# Patient Record
Sex: FEMALE | Race: WHITE | NOT HISPANIC OR LATINO | Employment: UNEMPLOYED | URBAN - METROPOLITAN AREA
[De-identification: names, ages, dates, MRNs, and addresses within clinical notes are randomized per-mention and may not be internally consistent; named-entity substitution may affect disease eponyms.]

---

## 2017-01-18 RX ORDER — LISINOPRIL AND HYDROCHLOROTHIAZIDE 12.5; 1 MG/1; MG/1
TABLET ORAL
Qty: 90 TAB | Refills: 1 | Status: SHIPPED | OUTPATIENT
Start: 2017-01-18 | End: 2017-05-02

## 2017-01-18 NOTE — TELEPHONE ENCOUNTER
Refill X 6 months, sent to pharmacy.Pt. Seen in the last 6 months per protocol.   Lab Results   Component Value Date/Time    SODIUM 135 11/15/2016 08:21 AM    POTASSIUM 3.6 11/15/2016 08:21 AM    CHLORIDE 100 11/15/2016 08:21 AM    CO2 27 11/15/2016 08:21 AM    GLUCOSE 86 11/15/2016 08:21 AM    BUN 18 11/15/2016 08:21 AM    CREATININE 0.67 11/15/2016 08:21 AM

## 2017-01-18 NOTE — TELEPHONE ENCOUNTER
Was the patient seen in the last year in this department? Yes     Does patient have an active prescription for medications requested? No     Received Request Via: Pharmacy      Pt met protocol?: Yes, LABS & OV 11/16 /70

## 2017-04-11 RX ORDER — LEVOTHYROXINE SODIUM 0.03 MG/1
TABLET ORAL
Qty: 45 TAB | Refills: 0 | Status: SHIPPED | OUTPATIENT
Start: 2017-04-11 | End: 2017-05-02 | Stop reason: SDUPTHER

## 2017-05-01 ENCOUNTER — TELEPHONE (OUTPATIENT)
Dept: MEDICAL GROUP | Facility: PHYSICIAN GROUP | Age: 52
End: 2017-05-01

## 2017-05-01 NOTE — TELEPHONE ENCOUNTER
ESTABLISHED PATIENT PRE-VISIT PLANNING     Note: Patient will not be contacted if there is no indication to call.     1.  Reviewed note from last office visit with PCP and/or other med group provider: Yes    2.  If any orders were placed at last visit, do we have Results/Consult Notes?        •  Labs - Labs ordered, completed and results are in chart.       •  Imaging - Imaging was not ordered at last office visit.       •  Referrals - No referrals were ordered at last office visit.    3.  Immunizations were updated in [facility] using WebIZ?: Yes       •  Web Iz Recommendations: TDAP    4.  Patient is due for the following Health Maintenance Topics:   Health Maintenance Due   Topic Date Due   • IMM DTaP/Tdap/Td Vaccine (1 - Tdap) 10/07/1986   • MAMMOGRAM  08/07/2016           5.  Patient was not informed to arrive 15 min prior to their scheduled appointment and bring in their medication bottles.

## 2017-05-02 ENCOUNTER — OFFICE VISIT (OUTPATIENT)
Dept: MEDICAL GROUP | Facility: PHYSICIAN GROUP | Age: 52
End: 2017-05-02
Payer: COMMERCIAL

## 2017-05-02 VITALS
SYSTOLIC BLOOD PRESSURE: 104 MMHG | OXYGEN SATURATION: 96 % | WEIGHT: 135 LBS | TEMPERATURE: 98.2 F | RESPIRATION RATE: 14 BRPM | HEIGHT: 64 IN | HEART RATE: 68 BPM | DIASTOLIC BLOOD PRESSURE: 64 MMHG | BODY MASS INDEX: 23.05 KG/M2

## 2017-05-02 DIAGNOSIS — E55.9 VITAMIN D INSUFFICIENCY: ICD-10-CM

## 2017-05-02 DIAGNOSIS — I10 ESSENTIAL HYPERTENSION: ICD-10-CM

## 2017-05-02 DIAGNOSIS — E03.1 CONGENITAL HYPOTHYROIDISM WITHOUT GOITER: ICD-10-CM

## 2017-05-02 DIAGNOSIS — Z00.00 PREVENTATIVE HEALTH CARE: ICD-10-CM

## 2017-05-02 PROCEDURE — 99214 OFFICE O/P EST MOD 30 MIN: CPT | Performed by: NURSE PRACTITIONER

## 2017-05-02 RX ORDER — LEVOTHYROXINE SODIUM 0.03 MG/1
TABLET ORAL
Qty: 45 TAB | Refills: 0 | Status: SHIPPED | OUTPATIENT
Start: 2017-05-02 | End: 2017-07-14 | Stop reason: SDUPTHER

## 2017-05-02 RX ORDER — HYDROCHLOROTHIAZIDE 12.5 MG/1
12.5 TABLET ORAL DAILY
Qty: 30 TAB | Refills: 2 | Status: SHIPPED | OUTPATIENT
Start: 2017-05-02 | End: 2017-10-05

## 2017-05-02 NOTE — MR AVS SNAPSHOT
"Laishana Pedro Pablo   2017 10:15 AM   Office Visit   MRN: [id number]    Department:  [facility]   Dept Phone:  [phone]    Description:  Female : 1965   Provider:  DEVI Rivero           Reason for Visit     Hypertension follow up       Allergies as of 2017     No Known Allergies      You were diagnosed with     Congenital hypothyroidism without goiter   [846866]       Essential hypertension   [4732370]       Preventative health care   [967632]       Vitamin D insufficiency   [778980]         Vital Signs     Blood Pressure Pulse Temperature Respirations Height Weight    104/64 mmHg 68 36.8 °C (98.2 °F) 14 1.613 m (5' 3.5\") 61.236 kg (135 lb)    Body Mass Index Oxygen Saturation Smoking Status             23.54 kg/m2 96% Former Smoker         Basic Information     Date Of Birth Sex Race Ethnicity Preferred Language    1965 Female White Non- English      Your appointments     Jul 10, 2017 10:15 AM   Established Patient with DEVI Rivero   [facility])    [address] [place] [address], [address]  [facility] [address] [phone]           You will be receiving a confirmation call a few days before your appointment from our automated call confirmation system.              Problem List              ICD-10-CM Priority Class Noted - Resolved    Essential hypertension I10   3/7/2016 - Present    Congenital hypothyroidism without goiter E03.1   3/7/2016 - Present    Vitamin D insufficiency E55.9   2016 - Present      Health Maintenance        Date Due Completion Dates    IMM DTaP/Tdap/Td Vaccine (1 - Tdap) 10/7/1986 ---    MAMMOGRAM 2016    PAP SMEAR 3/13/2019 3/13/2016            Current Immunizations     FLUMIST QUAD 10/7/2015    Influenza Vaccine Quad Inj (Preserved) 10/22/2016      Below and/or attached are the medications your provider expects you to take. Review all of your home medications and newly " ordered medications with your provider and/or pharmacist. Follow medication instructions as directed by your provider and/or pharmacist. Please keep your medication list with you and share with your provider. Update the information when medications are discontinued, doses are changed, or new medications (including over-the-counter products) are added; and carry medication information at all times in the event of emergency situations     Allergies:  No Known Allergies          Medications  Valid as of: May 02, 2017 - 10:52 AM    Generic Name Brand Name Tablet Size Instructions for use    HydroCHLOROthiazide (Tab) HYDRODIURIL 12.5 MG Take 1 Tab by mouth every day.        Levothyroxine Sodium (Tab) SYNTHROID 25 MCG TAKE 1/2 TABLET BY MOUTH ONCE DAILY IN THE MORNING ON AN EMPTY STOMACH FOR UNDERACTIVE THYROID.        .                 Medicines prescribed today were sent to:     [facility] [address] [place] [address] [address] [facility]    [address] [place] [address]    Phone: [phone] Fax: [phone]    Open 24 Hours?: No      Medication refill instructions:       If your prescription bottle indicates you have medication refills left, it is not necessary to call your provider’s office. Please contact your pharmacy and they will refill your medication.    If your prescription bottle indicates you do not have any refills left, you may request refills at any time through one of the following ways: The online [employer] system (except Urgent Care), by calling your provider’s office, or by asking your pharmacy to contact your provider’s office with a refill request. Medication refills are processed only during regular business hours and may not be available until the next business day. Your provider may request additional information or to have a follow-up visit with you prior to refilling your medication.   *Please Note: Medication refills are assigned a new Rx number when refilled  electronically. Your pharmacy may indicate that no refills were authorized even though a new prescription for the same medication is available at the pharmacy. Please request the medicine by name with the pharmacy before contacting your provider for a refill.           MyChart Status: Patient Declined

## 2017-05-02 NOTE — PROGRESS NOTES
Subjective:     Chief Complaint   Patient presents with   • Hypertension     follow up         Martha Chamorro is a 51 y.o. female here today for evaluation and management of:    Congenital hypothyroidism without goiter  Managed with 12.5 mcg (1/2 tablet 25 mcg) daily. Previous TSH at [place] was therapeutic, November 2016 level 1.9.     No temperature intolerance. No change in weight,  hair/skin quality, BMs.   No tremors, weakness.  No peripheral swelling.  No mood changes.        Essential hypertension  Managed with lisinopril-HCTZ 10-12.5 daily. Monitors at home regularly.  Stopped medication a few weeks ago, after a few days blood pressure was elevated but does not recall readings.  When she started having elevated blood pressure she was heavier, BP remained elevated after weight loss and dietary changes.   No symptoms low BP: light-headed, tunnel-vision, unusual fatigue.   No symptoms high BP: pounding headache, visual changes, palpitations, flushed face. No medicine adverse effects: unusual fatigue, slow heartbeat, cough.         Vitamin D insufficiency  Low at 25, advised supplement 2000 iu daily. She is taking combined supplement but not sure of the dose.            ROS   Denies HA, chest pain, shortness of breath, abdominal pain, bladder or bowel changes, lower extremity edema.    Current medicines (including changes today)  Current Outpatient Prescriptions   Medication Sig Dispense Refill   • levothyroxine (SYNTHROID) 25 MCG Tab TAKE 1/2 TABLET BY MOUTH ONCE DAILY IN THE MORNING ON AN EMPTY STOMACH FOR UNDERACTIVE THYROID. 45 Tab 0   • hydrochlorothiazide (HYDRODIURIL) 12.5 MG tablet Take 1 Tab by mouth every day. 30 Tab 2     No current facility-administered medications for this visit.       She  has a past medical history of Essential hypertension (3/7/2016) and Congenital hypothyroidism without goiter (3/7/2016).    Allergies Review of patient's allergies indicates no known allergies.    Current  "medications, problem list, allergies, past medical history, and tobacco use history reviewed in EPIC today.    Health maintenance reviewed and updated.    Objective:   Blood pressure 104/64, pulse 68, temperature 36.8 °C (98.2 °F), resp. rate 14, height 1.613 m (5' 3.5\"), weight 61.236 kg (135 lb), SpO2 96 %. Body mass index is 23.54 kg/(m^2).     Physical Exam   Constitutional: Alert, no acute distress. Pleasant and cooperative with the examination.  Skin:   Warm, dry, no rashes in visible areas.    Eyes:   Pupils equal, round. Conjunctiva and sclera clear,    Lids normal.  ENT:  Pinna normal.   Neck:   Supple, trachea midline.  Lungs:  Normal effort and respirations. Clear to auscultation bilaterally.  CV:  Regular rate and rhythm.  MS/Ext:  Steady gait, no edema.  Psych:  Eye contact is good, affect calm.    Assessment and Plan:   The following treatment plan was discussed    1. Congenital hypothyroidism without goiter  Stable. Continue current medicines. Monitor labs regularly.      - levothyroxine (SYNTHROID) 25 MCG Tab; TAKE 1/2 TABLET BY MOUTH ONCE DAILY IN THE MORNING ON AN EMPTY STOMACH FOR UNDERACTIVE THYROID.  Dispense: 45 Tab; Refill: 0    2. Essential hypertension  Over treated. Stop lisinopril/HCTZ and start HCTZ 12.5 mg daily. Monitor blood pressure and f/u with concerns.  - hydrochlorothiazide (HYDRODIURIL) 12.5 MG tablet; Take 1 Tab by mouth every day.  Dispense: 30 Tab; Refill: 2    3. Vitamin D insufficiency  Check current supplement, increase to 2000 iu.    4. Preventative health care    - MA-SCREEN MAMMO W/CAD-BILAT    Followup: Return in about 2 months (around 7/2/2017) for HTN.  As scheduled, sooner if symptoms don't resolve or with any new problems.       Reviewed side effects, risks, and benefits of medications prescribed today.  Advised to take all medications as instructed and report any side effects.   The patient voices understanding and agrees.  Report any new or worsening " symptoms.  Have labs or other diagnostic studies prior to follow up.  Keep all appointments for any referrals given.      Please note this dictation was created using voice recognition software. Every reasonable attempt has been made to correct obvious errors, however there may be errors of grammar and possibly content that were not discovered before finalizing the note.

## 2017-05-02 NOTE — ASSESSMENT & PLAN NOTE
Managed with 12.5 mcg (1/2 tablet 25 mcg) daily. Previous TSH at [place] was therapeutic, November 2016 level 1.9.     No temperature intolerance. No change in weight,  hair/skin quality, BMs.   No tremors, weakness.  No peripheral swelling.  No mood changes.

## 2017-05-02 NOTE — ASSESSMENT & PLAN NOTE
Managed with lisinopril-HCTZ 10-12.5 daily. Monitors at home regularly.  Stopped medication a few weeks ago, after a few days blood pressure was elevated but does not recall readings.  When she started having elevated blood pressure she was heavier, BP remained elevated after weight loss and dietary changes.   No symptoms low BP: light-headed, tunnel-vision, unusual fatigue.   No symptoms high BP: pounding headache, visual changes, palpitations, flushed face. No medicine adverse effects: unusual fatigue, slow heartbeat, cough.

## 2017-05-02 NOTE — ASSESSMENT & PLAN NOTE
Low at 25, advised supplement 2000 iu daily. She is taking combined supplement but not sure of the dose.

## 2017-07-10 ENCOUNTER — HOSPITAL ENCOUNTER (OUTPATIENT)
Dept: LAB | Facility: MEDICAL CENTER | Age: 52
End: 2017-07-10
Attending: NURSE PRACTITIONER
Payer: COMMERCIAL

## 2017-07-10 ENCOUNTER — OFFICE VISIT (OUTPATIENT)
Dept: MEDICAL GROUP | Facility: PHYSICIAN GROUP | Age: 52
End: 2017-07-10
Payer: COMMERCIAL

## 2017-07-10 ENCOUNTER — TELEPHONE (OUTPATIENT)
Dept: MEDICAL GROUP | Facility: PHYSICIAN GROUP | Age: 52
End: 2017-07-10

## 2017-07-10 VITALS
OXYGEN SATURATION: 96 % | DIASTOLIC BLOOD PRESSURE: 68 MMHG | TEMPERATURE: 98.3 F | HEART RATE: 62 BPM | WEIGHT: 138 LBS | RESPIRATION RATE: 14 BRPM | BODY MASS INDEX: 23.56 KG/M2 | SYSTOLIC BLOOD PRESSURE: 102 MMHG | HEIGHT: 64 IN

## 2017-07-10 DIAGNOSIS — I10 ESSENTIAL HYPERTENSION: ICD-10-CM

## 2017-07-10 DIAGNOSIS — E03.1 CONGENITAL HYPOTHYROIDISM WITHOUT GOITER: ICD-10-CM

## 2017-07-10 DIAGNOSIS — E55.9 VITAMIN D INSUFFICIENCY: ICD-10-CM

## 2017-07-10 LAB
25(OH)D3 SERPL-MCNC: 36 NG/ML (ref 30–100)
TSH SERPL DL<=0.005 MIU/L-ACNC: 2.02 UIU/ML (ref 0.3–3.7)

## 2017-07-10 PROCEDURE — 82306 VITAMIN D 25 HYDROXY: CPT

## 2017-07-10 PROCEDURE — 84443 ASSAY THYROID STIM HORMONE: CPT

## 2017-07-10 PROCEDURE — 99214 OFFICE O/P EST MOD 30 MIN: CPT | Performed by: NURSE PRACTITIONER

## 2017-07-10 PROCEDURE — 36415 COLL VENOUS BLD VENIPUNCTURE: CPT

## 2017-07-10 RX ORDER — LEVOTHYROXINE SODIUM 0.03 MG/1
TABLET ORAL
Qty: 45 TAB | Refills: 0 | Status: CANCELLED | OUTPATIENT
Start: 2017-07-10

## 2017-07-10 NOTE — ASSESSMENT & PLAN NOTE
Low at 25, advised supplement 2000 iu daily but she has not started. She is taking combined supplement but not sure of the dose.

## 2017-07-10 NOTE — MR AVS SNAPSHOT
"Martha Chamorro   7/10/2017 10:15 AM   Office Visit   MRN: [id number]    Department:  [facility]   Dept Phone:  [phone]    Description:  Female : 1965   Provider:  DEVI Rivero           Reason for Visit     Hypertension follow up       Allergies as of 7/10/2017     No Known Allergies      You were diagnosed with     Congenital hypothyroidism without goiter   [473773]       Essential hypertension   [5758457]       Vitamin D insufficiency   [196322]         Vital Signs     Blood Pressure Pulse Temperature Respirations Height Weight    102/68 mmHg 62 36.8 °C (98.3 °F) 14 1.613 m (5' 3.5\") 62.596 kg (138 lb)    Body Mass Index Oxygen Saturation Smoking Status             24.06 kg/m2 96% Former Smoker         Basic Information     Date Of Birth Sex Race Ethnicity Preferred Language    1965 Female White Non- English      Your appointments     Aug 23, 2017  9:55 AM   Established Patient with DEVI Rivero   [facility])    [address] [place] [address], [address]  [facility] [id number] [phone]           You will be receiving a confirmation call a few days before your appointment from our automated call confirmation system.              Problem List              ICD-10-CM Priority Class Noted - Resolved    Essential hypertension I10   3/7/2016 - Present    Congenital hypothyroidism without goiter E03.1   3/7/2016 - Present    Vitamin D insufficiency E55.9   2016 - Present      Health Maintenance        Date Due Completion Dates    IMM DTaP/Tdap/Td Vaccine (1 - Tdap) 10/7/1986 ---    MAMMOGRAM 2016    IMM INFLUENZA (1) 2017 10/22/2016, 10/7/2015    PAP SMEAR 3/13/2019 3/13/2016            Current Immunizations     FLUMIST QUAD 10/7/2015    Influenza Vaccine Quad Inj (Preserved) 10/22/2016      Below and/or attached are the medications your provider expects you to take. Review all of your home medications and " newly ordered medications with your provider and/or pharmacist. Follow medication instructions as directed by your provider and/or pharmacist. Please keep your medication list with you and share with your provider. Update the information when medications are discontinued, doses are changed, or new medications (including over-the-counter products) are added; and carry medication information at all times in the event of emergency situations     Allergies:  No Known Allergies          Medications  Valid as of: July 10, 2017 - 10:48 AM    Generic Name Brand Name Tablet Size Instructions for use    HydroCHLOROthiazide (Tab) HYDRODIURIL 12.5 MG Take 1 Tab by mouth every day.        Levothyroxine Sodium (Tab) SYNTHROID 25 MCG TAKE 1/2 TABLET BY MOUTH ONCE DAILY IN THE MORNING ON AN EMPTY STOMACH FOR UNDERACTIVE THYROID.        .                 Medicines prescribed today were sent to:     [facility] [address] [place] [address] [address] - [address] [facility] [employer]    [facility] [address] [place] [address]    Phone: [phone] Fax: [phone]    Open 24 Hours?: No      Medication refill instructions:       If your prescription bottle indicates you have medication refills left, it is not necessary to call your provider’s office. Please contact your pharmacy and they will refill your medication.    If your prescription bottle indicates you do not have any refills left, you may request refills at any time through one of the following ways: The online [employer] system (except Urgent Care), by calling your provider’s office, or by asking your pharmacy to contact your provider’s office with a refill request. Medication refills are processed only during regular business hours and may not be available until the next business day. Your provider may request additional information or to have a follow-up visit with you prior to refilling your medication.   *Please Note: Medication refills are assigned a new Rx number when  refilled electronically. Your pharmacy may indicate that no refills were authorized even though a new prescription for the same medication is available at the pharmacy. Please request the medicine by name with the pharmacy before contacting your provider for a refill.        Your To Do List     Future Labs/Procedures Complete By Expires    TSH WITH REFLEX TO FT4  As directed 7/11/2018    VITAMIN D,25 HYDROXY  As directed 7/11/2018         MyChart Status: Patient Declined

## 2017-07-10 NOTE — PROGRESS NOTES
"Subjective:     Chief Complaint   Patient presents with   • Hypertension     follow up      Martha Chamorro is a 51 y.o. female here today for evaluation and management of issues listed below.    Essential hypertension  Blood pressure \"really high\" at physical, was still high when she returned and blood pressure was still high so added lisinopril. When she started having elevated blood pressure she was heavier, BP remained elevated after weight loss and dietary changes.     Managed with lisinopril-HCTZ 10-12.5 daily. Monitors at home regularly.  Stopped medication  after a few days blood pressure was elevated but does not recall readings. We eliminated lisinopril but she forgot to bring record.       Congenital hypothyroidism without goiter  Managed with 12.5 mcg (1/2 tablet 25 mcg) daily. Previous TSH at [place] was therapeutic, November 2016 level 1.9.   Feeling a bit tired, sluggish, unexpected weight increase.       Vitamin D insufficiency  Low at 25, advised supplement 2000 iu daily but she has not started. She is taking combined supplement but not sure of the dose.          1. Essential hypertension    2. Congenital hypothyroidism without goiter    3. Vitamin D insufficiency        Allergies: Review of patient's allergies indicates no known allergies.  Current medicines (including changes today)  Current Outpatient Prescriptions   Medication Sig Dispense Refill   • levothyroxine (SYNTHROID) 25 MCG Tab TAKE 1/2 TABLET BY MOUTH ONCE DAILY IN THE MORNING ON AN EMPTY STOMACH FOR UNDERACTIVE THYROID. 45 Tab 0   • hydrochlorothiazide (HYDRODIURIL) 12.5 MG tablet Take 1 Tab by mouth every day. 30 Tab 2     No current facility-administered medications for this visit.       She  has a past medical history of Essential hypertension (3/7/2016) and Congenital hypothyroidism without goiter (3/7/2016).    ROS   Denies ROSS, chest pain, shortness of breath, abdominal pain, bladder or bowel changes, lower extremity " "edema.  Health Maintenance: Reviewed and updated.      Objective:     Blood pressure 102/68, pulse 62, temperature 36.8 °C (98.3 °F), resp. rate 14, height 1.613 m (5' 3.5\"), weight 62.596 kg (138 lb), SpO2 96 %. Body mass index is 24.06 kg/(m^2).  Physical Exam   Alert, no acute distress. Pleasant and cooperative with the examination.  Eye contact is good, affect calm.  Skin: Warm, dry, no rashes in visible areas.    Eyes: Pupils equal, round. Conjunctiva and sclera clear, lids normal.  ENT: Pinna normal. Neck: Supple, trachea midline.  Lungs: Normal effort and respirations. Clear to auscultation bilaterally.  CV: Regular rate and rhythm.  Abdomen: No CVAT  MS/Ext: Steady gait, no edema.        Results reviewed labs     Assessment and Plan:   Martha was seen today for hypertension.    Diagnoses and all orders for this visit:    Essential hypertension    Congenital hypothyroidism without goiter  -     TSH WITH REFLEX TO FT4; Future    Vitamin D insufficiency  -     VITAMIN D,25 HYDROXY; Future    Other orders  -     Cancel: levothyroxine (SYNTHROID) 25 MCG Tab; TAKE 1/2 TABLET BY MOUTH ONCE DAILY IN THE MORNING ON AN EMPTY STOMACH FOR UNDERACTIVE THYROID.        Treatment Changes: stop HCTZ and monitor blood pressure regularly. Bring record to next visit. Lab visit today to assess TSH level, medication to follow. Will also check vitamin D level for any improvement.      Followup: Return in about 4 weeks (around 8/7/2017). sooner should new symptoms or problems arise, or as previously scheduled..       Reviewed side effects, risks, and benefits of medications prescribed today.  Advised to take all medications as instructed and report any side effects.   The patient voices understanding and agrees.  Report any new or worsening symptoms.  Have labs or other diagnostic studies prior to follow up.  Keep all appointments for any referrals given.    Please note this dictation was created using voice recognition software. " Every reasonable attempt has been made to correct obvious errors, however there may be errors of grammar and possibly content that were not discovered before finalizing the note.

## 2017-07-10 NOTE — ASSESSMENT & PLAN NOTE
"Blood pressure \"really high\" at physical, was still high when she returned and blood pressure was still high so added lisinopril. When she started having elevated blood pressure she was heavier, BP remained elevated after weight loss and dietary changes.     Managed with lisinopril-HCTZ 10-12.5 daily. Monitors at home regularly.  Stopped medication  after a few days blood pressure was elevated but does not recall readings. We eliminated lisinopril but she forgot to bring record.     "

## 2017-07-10 NOTE — ASSESSMENT & PLAN NOTE
Managed with 12.5 mcg (1/2 tablet 25 mcg) daily. Previous TSH at [place] was therapeutic, November 2016 level 1.9.   Feeling a bit tired, sluggish, unexpected weight increase.

## 2017-07-11 NOTE — TELEPHONE ENCOUNTER
Call patient, her TSH level is higher than previous but still in range. Given her symptoms, we can increase her dose a little and see how she feels. This would mean taking a whole tablet of 25 mg instead of half tablet. If so, she'll need to repeat labs in 6 weeks. I'll send new rx to pharmacy.  MINNIE Rivero.

## 2017-07-14 ENCOUNTER — TELEPHONE (OUTPATIENT)
Dept: MEDICAL GROUP | Facility: PHYSICIAN GROUP | Age: 52
End: 2017-07-14

## 2017-07-14 DIAGNOSIS — E03.1 CONGENITAL HYPOTHYROIDISM WITHOUT GOITER: ICD-10-CM

## 2017-07-14 RX ORDER — LEVOTHYROXINE SODIUM 0.03 MG/1
25 TABLET ORAL
Qty: 90 TAB | Refills: 1 | Status: SHIPPED | OUTPATIENT
Start: 2017-07-14 | End: 2018-02-24 | Stop reason: SDUPTHER

## 2017-07-14 NOTE — TELEPHONE ENCOUNTER
I called Pt 7/11/17 regarding med change for levothyroxine. Pt was advised to take 1 tab instead of half but new medication was never sent. I called in script for levothyroxine 25 mcg qty: 30 sig take 1 take by mouth once daily in the morning on empty stomach for underactive thyroid.

## 2017-08-03 RX ORDER — LEVOTHYROXINE SODIUM 0.03 MG/1
TABLET ORAL
Refills: 0 | OUTPATIENT
Start: 2017-08-03

## 2017-10-05 ENCOUNTER — HOSPITAL ENCOUNTER (OUTPATIENT)
Dept: LAB | Facility: MEDICAL CENTER | Age: 52
End: 2017-10-05
Attending: NURSE PRACTITIONER
Payer: COMMERCIAL

## 2017-10-05 ENCOUNTER — OFFICE VISIT (OUTPATIENT)
Dept: MEDICAL GROUP | Facility: PHYSICIAN GROUP | Age: 52
End: 2017-10-05
Payer: COMMERCIAL

## 2017-10-05 VITALS
DIASTOLIC BLOOD PRESSURE: 64 MMHG | WEIGHT: 138 LBS | BODY MASS INDEX: 23.56 KG/M2 | RESPIRATION RATE: 16 BRPM | SYSTOLIC BLOOD PRESSURE: 118 MMHG | HEIGHT: 64 IN | TEMPERATURE: 97.6 F | HEART RATE: 75 BPM | OXYGEN SATURATION: 96 %

## 2017-10-05 DIAGNOSIS — E55.9 VITAMIN D INSUFFICIENCY: ICD-10-CM

## 2017-10-05 DIAGNOSIS — I10 ESSENTIAL HYPERTENSION: ICD-10-CM

## 2017-10-05 DIAGNOSIS — E03.1 CONGENITAL HYPOTHYROIDISM WITHOUT GOITER: ICD-10-CM

## 2017-10-05 DIAGNOSIS — Z00.00 PREVENTATIVE HEALTH CARE: ICD-10-CM

## 2017-10-05 LAB
25(OH)D3 SERPL-MCNC: 24 NG/ML (ref 30–100)
ALBUMIN SERPL BCP-MCNC: 4.2 G/DL (ref 3.2–4.9)
ALBUMIN/GLOB SERPL: 1.4 G/DL
ALP SERPL-CCNC: 80 U/L (ref 30–99)
ALT SERPL-CCNC: 20 U/L (ref 2–50)
ANION GAP SERPL CALC-SCNC: 9 MMOL/L (ref 0–11.9)
AST SERPL-CCNC: 22 U/L (ref 12–45)
BILIRUB SERPL-MCNC: 0.5 MG/DL (ref 0.1–1.5)
BUN SERPL-MCNC: 10 MG/DL (ref 8–22)
CALCIUM SERPL-MCNC: 9.6 MG/DL (ref 8.5–10.5)
CHLORIDE SERPL-SCNC: 102 MMOL/L (ref 96–112)
CHOLEST SERPL-MCNC: 183 MG/DL (ref 100–199)
CO2 SERPL-SCNC: 26 MMOL/L (ref 20–33)
CREAT SERPL-MCNC: 0.53 MG/DL (ref 0.5–1.4)
GFR SERPL CREATININE-BSD FRML MDRD: >60 ML/MIN/1.73 M 2
GLOBULIN SER CALC-MCNC: 3.1 G/DL (ref 1.9–3.5)
GLUCOSE SERPL-MCNC: 70 MG/DL (ref 65–99)
HDLC SERPL-MCNC: 52 MG/DL
LDLC SERPL CALC-MCNC: 108 MG/DL
POTASSIUM SERPL-SCNC: 4.2 MMOL/L (ref 3.6–5.5)
PROT SERPL-MCNC: 7.3 G/DL (ref 6–8.2)
SODIUM SERPL-SCNC: 137 MMOL/L (ref 135–145)
TRIGL SERPL-MCNC: 114 MG/DL (ref 0–149)
TSH SERPL DL<=0.005 MIU/L-ACNC: 1.64 UIU/ML (ref 0.3–3.7)

## 2017-10-05 PROCEDURE — 80053 COMPREHEN METABOLIC PANEL: CPT

## 2017-10-05 PROCEDURE — 82306 VITAMIN D 25 HYDROXY: CPT

## 2017-10-05 PROCEDURE — 99214 OFFICE O/P EST MOD 30 MIN: CPT | Performed by: NURSE PRACTITIONER

## 2017-10-05 PROCEDURE — 36415 COLL VENOUS BLD VENIPUNCTURE: CPT

## 2017-10-05 PROCEDURE — 80061 LIPID PANEL: CPT

## 2017-10-05 PROCEDURE — 84443 ASSAY THYROID STIM HORMONE: CPT

## 2017-10-05 NOTE — ASSESSMENT & PLAN NOTE
Managed with 12.5 mcg (1/2 tablet 25 mcg) daily. She was feeling a bit tired, sluggish, unexpected weight increase and we increased levothyroxine to 25 mcg daily. She is planning to visit lab today. She is feeling much better, more energetic.

## 2017-10-05 NOTE — PROGRESS NOTES
"Subjective:     Chief Complaint   Patient presents with   • Results     Labs [facility]   • Chronic Care Management     Martha Chamorro is a 51 y.o. female here today for evaluation and management of issues listed below.    Vitamin D insufficiency  Low at 25, advised supplement 2000 iu daily. She is taking combined supplement but not sure of the dose, recent level improved to 36.     Essential hypertension  Managed with lisinopril-HCTZ 10-12.5 daily.  We eliminated lisinopril then HCTZ and home readings have been in range. 119/76, 113/79.      Congenital hypothyroidism without goiter  Managed with 12.5 mcg (1/2 tablet 25 mcg) daily. She was feeling a bit tired, sluggish, unexpected weight increase and we increased levothyroxine to 25 mcg daily. She is planning to visit lab today. She is feeling much better, more energetic.     1. Congenital hypothyroidism without goiter    2. Vitamin D insufficiency    3. Essential hypertension    4. Preventative health care        ROS   Denies HA, chest pain, shortness of breath, abdominal pain, bladder or bowel changes, lower extremity edema. All other pertinent systems reviewed and are negative except as in HPI.    Allergies: Review of patient's allergies indicates no known allergies.  Current medicines (including changes today)  Current Outpatient Prescriptions   Medication Sig Dispense Refill   • levothyroxine (SYNTHROID) 25 MCG Tab Take 1 Tab by mouth Every morning on an empty stomach. 90 Tab 1     No current facility-administered medications for this visit.        She  has a past medical history of Congenital hypothyroidism without goiter (3/7/2016) and Essential hypertension (3/7/2016).      Health Maintenance: Reviewed and updated.      Objective:   Blood pressure 118/64, pulse 75, temperature 36.4 °C (97.6 °F), resp. rate 16, height 1.613 m (5' 3.5\"), weight 62.6 kg (138 lb), SpO2 96 %. Body mass index is 24.06 kg/m².  Physical Exam   Alert, no acute distress. Pleasant and " cooperative with the examination.  Eye contact is good, affect calm.  Skin: Warm, dry, no rashes in visible areas.    Eyes: Pupils equal, round. Conjunctiva and sclera clear, lids normal.  ENT: Pinna normal.  Neck: Supple, trachea midline.  Lungs: Normal effort and respirations. Clear to auscultation bilaterally.   Abdomen: No CVAT   CV: Regular rate and rhythm.  MS/Ext: Steady gait, no edema.    Results reviewed  labs    Assessment and Plan:   Treatment:   Martha was seen today for results and chronic care management.    Diagnoses and all orders for this visit:    Congenital hypothyroidism without goiter  She'll have labs done and be notified of results and any recommended dose changes. Meantime she'll continue current medication. She does plan to sign up for my chart.  -     TSH WITH REFLEX TO FT4; Future    Vitamin D insufficiency she'll continue accommodation supplement. We'll continue to monitor.   Essential hypertension currently stable without medication. She'll continue regular physical activity, healthy eating, and limiting dietary sodium and caffeine.   Preventative health care  -     LIPID PROFILE; Future  -     COMP METABOLIC PANEL; Future  -     VITAMIN D,25 HYDROXY; Future  -     MA-SCREEN MAMMO W/CAD-BILAT        Followup: Return in about 6 months (around 4/5/2018) for preventive care visit. Sooner should new symptoms or problems arise, or as previously scheduled.           Reviewed side effects, risks, and benefits of medications prescribed today.  Advised to take all medications as instructed and report any side effects.   The patient voices understanding and agrees.  Report any new or worsening symptoms.  Have labs or other diagnostic studies prior to follow up.  Keep all appointments for any referrals given.        Please note this dictation was created using voice recognition software. Every reasonable attempt has been made to correct obvious errors, however there may be errors of grammar and  possibly content that were not discovered before finalizing the note.

## 2017-10-05 NOTE — ASSESSMENT & PLAN NOTE
Managed with lisinopril-HCTZ 10-12.5 daily.  We eliminated lisinopril then HCTZ and home readings have been in range. 119/76, 113/79.

## 2017-10-05 NOTE — ASSESSMENT & PLAN NOTE
Low at 25, advised supplement 2000 iu daily. She is taking combined supplement but not sure of the dose, recent level improved to 36.

## 2018-01-13 ENCOUNTER — HOSPITAL ENCOUNTER (OUTPATIENT)
Dept: RADIOLOGY | Facility: MEDICAL CENTER | Age: 53
End: 2018-01-13
Attending: NURSE PRACTITIONER
Payer: COMMERCIAL

## 2018-01-13 PROCEDURE — 77067 SCR MAMMO BI INCL CAD: CPT

## 2018-01-15 ENCOUNTER — HOSPITAL ENCOUNTER (OUTPATIENT)
Dept: RADIOLOGY | Facility: MEDICAL CENTER | Age: 53
End: 2018-01-15

## 2018-02-24 DIAGNOSIS — E03.1 CONGENITAL HYPOTHYROIDISM WITHOUT GOITER: ICD-10-CM

## 2018-02-26 RX ORDER — LEVOTHYROXINE SODIUM 0.03 MG/1
TABLET ORAL
Qty: 90 TAB | Refills: 1 | Status: SHIPPED | OUTPATIENT
Start: 2018-02-26 | End: 2018-07-13 | Stop reason: SDUPTHER

## 2018-03-30 ENCOUNTER — TELEPHONE (OUTPATIENT)
Dept: MEDICAL GROUP | Facility: PHYSICIAN GROUP | Age: 53
End: 2018-03-30

## 2018-03-30 NOTE — TELEPHONE ENCOUNTER
Future Appointments       Provider Department Center    4/2/2018 11:35 AM DEVI Rivero [facility]        ESTABLISHED PATIENT PRE-VISIT PLANNING     Note: Patient will not be contacted if there is no indication to call.     1.  Reviewed notes from the last few office visits within the medical group: Yes 10/05/2017    2.  If any orders were placed at last visit or intended to be done for this visit (i.e. 6 mos follow-up), do we have Results/Consult Notes?        •  Labs - Labs were not ordered at last office visit.       •  Imaging - Imaging ordered, completed and results are in chart.       •  Referrals - No referrals were ordered at last office visit.    3. Is this appointment scheduled as a Hospital Follow-Up? No    4.  Immunizations were updated in [employer] using WebIZ?: Yes       •  Web [employer] Recommendations: MMR , TDAP and VARICELLA (Chicken Pox)     5.  Patient is due for the following Health Maintenance Topics:   Health Maintenance Due   Topic Date Due   • IMM DTaP/Tdap/Td Vaccine (1 - Tdap) 10/07/1986   • COLONOSCOPY  10/07/2017         6.  MDX printed for Provider? NO INS [facility]     7.  Patient was informed to arrive 15 min prior to their scheduled appointment and bring in their medication bottles. [place]

## 2018-04-02 ENCOUNTER — APPOINTMENT (OUTPATIENT)
Dept: MEDICAL GROUP | Facility: PHYSICIAN GROUP | Age: 53
End: 2018-04-02
Payer: COMMERCIAL

## 2018-07-13 DIAGNOSIS — E03.1 CONGENITAL HYPOTHYROIDISM WITHOUT GOITER: ICD-10-CM

## 2018-07-16 RX ORDER — LEVOTHYROXINE SODIUM 0.03 MG/1
TABLET ORAL
Qty: 90 TAB | Refills: 0 | Status: SHIPPED | OUTPATIENT
Start: 2018-07-16